# Patient Record
Sex: FEMALE | Race: WHITE
[De-identification: names, ages, dates, MRNs, and addresses within clinical notes are randomized per-mention and may not be internally consistent; named-entity substitution may affect disease eponyms.]

---

## 2018-02-06 NOTE — EKG
[place] [address]
Phone:  [phone]                     ELECTROCARDIOGRAM REPORT      
_______________________________________________________________________________
 
Name:       NAYLAEMILEE HALL OSORIO              Room:                      [facility]#:  [id number]      Account #:      [id number]  
Admission:  18     Attend Phys:                         
Discharge:  18     Date of Birth:  [birth year]  
         Report #: [id number]
    [id number]
_______________________________________________________________________________
THIS REPORT FOR:  //name//                      
 
                         [facility] ED
                                       
Test Date:    2018               Test Time:    11:14:16
Pat Name:     EMILEE MURILLO          Department:   
Patient ID:   [id number]            Room:          
Gender:       F                        Technician:   LUPE SHELLEY
:          1945               Requested By: Josue Chappell
Order Number: [id number] MD:   Billy Lawson
                                 Measurements
Intervals                              Axis          
Rate:         97                       P:            38
OK:           150                      QRS:          -24
QRSD:         83                       T:            31
QT:           351                                    
QTc:          446                                    
                           Interpretive Statements
Sinus rhythm
consider Inferior infarct, old
Anterior infarct, old
Compared to ECG 2016 16:05:12
Sinus tachycardia no longer present
Myocardial infarct finding still present
 
Electronically Signed On 2018 11:41:09 CST by Billy Lawson
[email or web address]
 
 
 
 
 
 
 
 
 
 
 
 
 
 
 
 
  <ELECTRONICALLY SIGNED>
                                           By: Billy Lawson MD, [facility]      
  18     1141
D: 18 1114   _____________________________________
T: 18 1114   Billy Lawson MD, [facility]        /EPI

## 2019-07-05 NOTE — NUR
PT INCREASINGLY SOB. O2 INCREASED TO 3L/NC. REFUSING TO TAKE RESP TX STATING
IT MAKES HER BP GO UP. DR NOTIFIED AND TX CHANGED. ALSO INFORMED OF TEMP
102.2, PT STATES SHE DOES THIS AT NIGHT OCC.

## 2019-07-05 NOTE — NUR
RECEIVED REPORT AND ASSUMED CARE OF PT, ASSESSMENT COMPLETED. FAMILY AT
BEDSIDE. PT SITTING UP IN BED, SOB WITH ACTIVITY. O2 ON AT 2L/NC. TELEMETRY ON
SHOWING ST. WILL CONT TO MONITOR AND ASSIST AS NEEDED.

## 2019-07-05 NOTE — NUR
ASSESSMENT COMPLETE. PT ADMITTED WITH PNEUMONIA. PT GIVEN IV ROCEPHIN AND
ZITHRO IN ER. RT QID. PT IS ON 2L PER NC, PT DOES NOT WEAR O2 AT HOME. PT HAS
HX OF COPD, FORMER SMOKER. PT HAD LOW GRADE TEMP, TYLENOL GIVEN IN ER. PT
TOLERATED DINNER, DENIES N/V. PT DENIES PAIN. NSR ON TELE MONITOR. PT IS
RESTING IN BED WITH FAMILY AT BEDSIDE. SEE ASSESSMENT AND VITALS FOR OTHER
DETAILS. CALL LIGHT WITHIN REACH, WILL CONTINUE PLAN OF CARE

## 2019-07-06 NOTE — NUR
AFTER ATIVAN GIVEN PT SLEPT VERY WELL. RESP STATUS IMPROVED. TEMP IMPROVED AND
PT DIAPHORETIC. O2 REMAINS ON. ASSISTED TO BSC. TELEMETRY CONT TO SHOW ST WITH
OCC PVC. HS GOALS OF REST AND SAFETY ACHIEVED. HOURLY ROUNDING OBSERVED.

## 2019-07-07 NOTE — NUR
PT SLEPT ON AND OFF THIS SHIFT. ASSESSMENT AS DOCUMENTED. MEDS GIVEN PER
E-MAR. IV PATENT. PT ANXIOUS AT TIMES THIS SHIFT. TELE MONITOR READING SR. OR
WORN. WILL CONTINUE WITH PLAN OF CARE.

## 2019-07-07 NOTE — NUR
CHANGE OF SHIFT, BEDSIDE REPORT GIVEN
PATIENT SEEN IN BED WITH DAUGHTER AT BEDSIDE
ASSUMED PATIENT CARE

## 2019-07-08 NOTE — CON
[address]  [address]                    CONSULTATION                  
_______________________________________________________________________________
 
Name:       EMILEE MURILLO              Room:           [address] [place]  
M.R.#:  [id number]      Account #:      [id number]  
Admission:  07/05/19     Attend Phys:    Thomas Acevedo MD 
Discharge:               Date of Birth:  [birth year]  
         Report #: [id number]
                                                                     [id number]  
_______________________________________________________________________________
THIS REPORT FOR:  //name//                      
 
CC: Thomas GREGORY
 
REQUESTING PHYSICIAN:  Thomas Acevedo MD
 
INDICATION FOR CONSULTATION:  Shortness of breath.
 
HISTORY OF PRESENT ILLNESS:  This is a 73-year-old female.  She carries a
diagnosis of chronic obstructive pulmonary disease.  She does appear to have had
significant bronchospasm; however, it is not completely clear to me if the
patient in fact has chronic obstructive pulmonary disease or asthma.  As noted
below it appears at first glance more likely to me that the patient has asthma
instead.  She does have a remote history of smoking.
 
At this time, the patient was admitted 2 days ago, presentation was with
increasing shortness of breath.  The patient has also been coughing and had been
bringing up green and yellow sputum.  She had reported feeling febrile as well
and did have a low-grade fever of 37.8 degrees Celsius on presentation.  She has
had significant hoarseness.  She is not able to describe to me how long she has
had this hoarseness.  She, however, does not describe a sore throat or runny
nose, otherwise.  She has had some pain in lower extremities.  She has had
varicose veins.  She does not report any change in these.  She does not have
swelling of lower extremities.  The patient has only had rare episodes of
heartburn.  She does have a history of disturbed sleep at night as well as
sleepiness during the day.
 
She has had some joint pains, which remain at baseline.  She does not have
abdominal pain.  She does not have nausea, vomiting, diarrhea or constipation. 
She does not have any urinary complaints.
 
REVIEW OF SYSTEMS:  I asked her 14 questions for review of systems.  The patient
answered to the negative except as mentioned above.
 
PAST MEDICAL HISTORY:  History of obstructive lung disease.  Chronic obstructive
pulmonary disease versus asthma, see further discussion as below.  Uncontrolled
hypertension, blood pressure on admission was 201/80.  She has had influenza in
the past, appendectomy, and hysterectomy.  There is an echo done 2 years ago,
which does show changes consistent with hypertension with left ventricular
hypertrophy, but normal left ventricular ejection fraction and no elevation in
the right heart pressures.  Also, history of right wrist contusion and
palpitations.
 
SOCIAL HISTORY:  She says that she was a smoker when she was young, but she
discontinued when she was in her mid 30s and has not smoked since then.  No
 
 
 
[place] [address]                    CONSULTATION                  
_______________________________________________________________________________
 
Name:       EMILEE MURILLO              Room:           [address] [place]  
..#:  [id number]      Account #:      [id number]  
Admission:  07/05/19     Attend Phys:    Thomas Acevedo MD 
Discharge:               Date of Birth:  [birth year]  
         Report #: [id number]
                                                                     [id number]  
_______________________________________________________________________________
known history of heavy alcohol use or illegal drug use.
 
CURRENT MEDICATIONS:  List in [place] reviewed.
 
HOME MEDICATIONS:  She is noted to be on Advair at home.
 
FAMILY HISTORY:  There is no family history pertinent to the presenting
complaint.
 
ALLERGIES:  SHE IS REPORTED TO HAVE AN ALLERGY TO PENICILLIN.  I DO NOT HAVE
DETAILS AVAILABLE.  SHE IS TOLERATING CEPHALOSPORINS WITHOUT ANY PROBLEMS.  SHE
IS REPORTED TO HAVE HAD ADVERSE REACTIONS OR ALLERGIES TO CODEINE AND BACTRIM AS
WELL.  AGAIN, DETAILS ARE NOT AVAILABLE.  SHE HAS HAD SOME ITCHING WITH
PREDNISONE; HOWEVER, I AM DOUBTFUL THAT SHE ACTUALLY IS ALLERGIC TO PREDNISONE.
 
PHYSICAL EXAMINATION:
GENERAL:  She is alert, awake and oriented.  Her responses to questions
frequently are, however, not related to the questions asked.  For example, when
I asked her when she came to this hospital, she responded by telling me when she
had pneumonia vaccines.  She does answer orientation questions correctly.  She
does appear to be hoarse.
VITAL SIGNS:  Has a pulse of 90 and a blood pressure 144/58.  She is saturating
93%.  She is not on supplemental oxygen.  Respiratory rate is mildly elevated to
20.  She is afebrile now with a temperature of 36.7.  Two days ago, she had a
fever of 37.8.
HEENT:  Head is normocephalic and atraumatic.  Pupils are equal and reactive. 
There is no throat erythema.  There is no thrush in her throat.  Airway is
Mallampati 3.
NECK:  Does not show raised JVP, asymmetry, mass or lymph nodes.
CHEST:  Symmetrical expansion on inspection and palpation.  On auscultation,
breath sounds are bilaterally equal, decreased, expirations are prolonged.  I do
not hear any added sounds.
HEART:  Regular.  There is no murmur.
ABDOMEN:  Soft and nontender.
EXTREMITIES:  Lower extremities show no edema.  There are significant varicose
veins noted bilaterally.  There is some calf discomfort.
SKIN:  However, is dry and intact.
NEUROLOGICAL:  Moves all extremities bilaterally equally and spontaneously with
no focal deficit identified.
 
LABORATORY DATA:  The patient's chest x-ray is reviewed and compared with the
patient's previous chest x-rays.  There are chronic changes suspicious of an
interstitial lung disease noted.  I do not see any definite evidence of new
infiltrate; however, I cannot rule out due to the presence of these chronic
changes.  The patient's CBC as well as chemistries from this morning are noted. 
Potassium of 3.1 this morning noted.  I requested magnesium to be added to this
 
 
 
[address] [place] [address]                    CONSULTATION                  
_______________________________________________________________________________
 
Name:       EMILEE MURILLO              Room:           [address] [place]  
.R.#:  [id number]      Account #:      [id number]  
Admission:  07/05/19     Attend Phys:    Thomas Acevedo MD 
Discharge:               Date of Birth:  [birth year]  
         Report #: [id number]
                                                                     [id number]  
_______________________________________________________________________________
morning's labs, which  came back normal.  Coagulation studies in [facility]
reviewed.  There are no positive cultures so far.
 
ASSESSMENT AND PLAN:
1.  Acute respiratory insufficiency.  The patient does appear to be
bronchospastic.  In addition, she does appear to have acute bacterial
bronchitis.  This is the likely etiology of her acute respiratory insufficiency.
2.  Bronchial asthma exacerbation.  The patient does have an obstructive lung
disease.  Certainly, it will be possible that she has chronic obstructive
pulmonary disease instead.  I do not have full details available.  At this time,
it appears more likely to me though that she has asthma.  We will continue with
Solu-Medrol as currently described.  She is on low dose of Xopenex.  We
potentially could increase the dose or switch her over to albuterol.  I went
ahead in fact and switched her over to DuoNeb in addition to adding Singulair. 
The patient has significant hoarseness.  Therefore, for now, I discontinued her
budesonide nebulizer as this will worsen hoarseness.  In the long run, however,
I do feel that she will benefit from an inhaled corticosteroid and I suggest
restarting an inhaled corticosteroid once her hoarseness improves.  Note that
she has been taking Advair at home.
3.  Lung infiltrates/lung scarring.  Most of the infiltrates noted on the
patient's chest x-ray appeared to be due to old scarring, but she has had
significant cough with sputum production.  Therefore, I feel that it is
reasonable to treat with Zithromax as well as ceftriaxone, as is currently
prescribed.  Since most of the changes are longstanding, I would go ahead and
obtain a CT chest without contrast, but with high resolution as this is
suspicious of an underlying interstitial lung disease.
4.  Hoarseness.  See discussion as above.  There may be a component of
gastroesophageal reflux as well.  The patient already is on Protonix.  The
patient may benefit from ENT evaluation if hoarseness persists.
5.  Hypokalemia.  Potassium was 3.1 this morning.  The patient has since then
received Lasix.  I therefore went ahead and placed her on the replacement
protocol.
6.  Hyperglycemia.  She does not have a history of diabetes.  Recommend
followup.  This may be related to Solu-Medrol.  As I would like to replace the
potassium first, I did not address it at this time, I would defer followup to
the hospitalist service  Hyperglycemia may in fact improve when the Solu-Medrol
dose is decreased later.
7.  Deep venous thrombosis prophylaxis.  The patient is on Lovenox.
 
Thanks for this consultation.
 
 
 
 
<ELECTRONICALLY SIGNED>
                                        By:  David Johnson MD         
07/08/19 0902
D: 07/07/19 1931_______________________________________
T: 07/08/19 0040Darryl Aparicio MD                 /nt

## 2019-07-08 NOTE — NUR
PATIENT RESTING IN CHAIR. UP TO CHAIR FOR MEALS TODAY. AMBULATING WITH WALKER
ACROSS ROOM WITH STAFF PRESENT. JAI DELGADILLO APPLIED BILAT LE. PATIENT PATRICIO WELL.
ABX INFUSED AS ORDERED. PATIENT STATES "THE DOCTOR SAYS I NEED TO TAKE IT ONE
DAY AT A TIME." APPROPRIATE USE OF CALL LIGHT. PROGRESSING TOWARD GOALS.

## 2019-07-08 NOTE — NUR
cm completed initial assessment to complete d/c planning. pt a&Ox4. pt states
she lives alone. dtrs are her support system. pt is independent w/cares and
she drives and works in her garden. pt states she uses cane, and walker for
support when using the commode at this if she cannot make it to the restroom.
pt has used HH in the distance past; however, she doesnt recall which company
she used. no hx w/snf. pt doesnt have any anticipated needs. cm will remain
available to assist if needed.

## 2019-07-08 NOTE — EKG
[place] [address]
Phone:  [phone]                     ELECTROCARDIOGRAM REPORT      
_______________________________________________________________________________
 
Name:       EMILEE MURILLO              Room:           [address]    [place]  
.R.#:  [id number]      Account #:      [id number]  
Admission:  19     Attend Phys:    Thomas Acevedo MD 
Discharge:               Date of Birth:  [birth year]  
         Report #: [id number]
    [id number]
_______________________________________________________________________________
THIS REPORT FOR:  //name//                      
 
                         [facility] ED
                                       
Test Date:    2019               Test Time:    13:08:45
Pat Name:     EMILEE MURILLO          Department:   
Patient ID:   [id number]            Room:         [facility]
Gender:       F                        Technician:   PITO
:          1945               Requested By: Ed Chang
Order Number: [id number] MD:   Billy Lawson
                                 Measurements
Intervals                              Axis          
Rate:         114                      P:            57
CO:           164                      QRS:          -14
QRSD:         90                       T:            30
QT:           319                                    
QTc:          440                                    
                           Interpretive Statements
Sinus tachycardia
Anterior infarct, old
Compared to ECG 2018 11:14:16
Sinus rhythm no longer present
Myocardial infarct finding still present
 
Electronically Signed On 2019 10:38:08 CDT by Billy Lawson
[email or web address]
 
 
 
 
 
 
 
 
 
 
 
 
 
 
 
 
 
  <ELECTRONICALLY SIGNED>
                                           By: Billy Lawson MD, FACC      
  19     1038
D: 19 1308   _____________________________________
T: 19 1308   Billy Lawson MD, [facility]        /EPI

## 2019-07-08 NOTE — NUR
Nutrition: Pt admitted with PNA. COPD, kyphosis, pulm fibrosis. , alb
2.2, prealb 11.7. 2gm Na diet, poor po intake. Wt is near usual, 166#. RX:
solumedrol. Severely sepleted protein stores and nsg risk 2 points. RD will
order Glucerna for added nutrition. Consider Mild risk at this time.

## 2019-07-08 NOTE — NUR
ASSUMED CARE AFTER REPORT. A&OX4, ABLE TO COMMUNICATE NEEDS TO STAFF. CARDIAC
MONITOR IN PLACE, SR, O2 SATS 94% RA. UP WITH WALKER TO BSC WITH SBA. NO C/O
PAIN, NAUSEA, SOA OR OTHER DISTRESS. TEARFUL WHEN SPEAKING ABOUT FAMILY
STRESSORS. THERAPEUTIC COMMUNICATION TECHNIQUE: LISTENING.  CALL LIGHT IN
REACH.

## 2019-07-08 NOTE — NUR
PATIENT SLEPT OFF AND ON THIS SHIFT. ASSESSMENT AS CHARTED. MEDS ADMINISTERED
PER EMAR. NO ACUTE CHANGES OVERNIGHT. WILL CONTINUE TO MONITOR PLAN OF CARE.

## 2019-07-09 NOTE — NUR
ASSUMED CARE AFTER REPORT. A&OX4. ABLE TO COMMUNICATE NEEDS TO STAFF. CARDIAC
MONITOR IN PLACE, SR. O2 SATS 94%. BP ELEVATED, PRN HYDRALAZINE GIVEN AS PER
MAR. RECHECK REVEALS BP  APPROPRIATE USE OF CALL LIGHT WHEN IN NEED OF BSC.
HOURLY ROUNDING FOR SAFETY AND PATIENT NEEDS.

## 2019-07-10 NOTE — NUR
CONTINUE TO FOLLOW, MET WITH PT TO DISCUSS DC, POSSIBLY TOMORROW. PT STATES
SHE HAS GOOD SUPPORT FROM HER DTRS THAT LIVE CLOSEBY.  PT IS NORMALLY VERY
INDEPENDENT; DRIVES, MOWS AND JUST [place].  SHE PLANS
TO RETURN HOME.  ENCOURAGED ACTIVITY TODAY

## 2019-07-10 NOTE — NUR
VSS, ASSUMED CARE IN THE AM, ASSESSMENT PERFORMED AND CHARTED, FALL
PRECAUTIONS IN PLACE AND CALL LIGHT IN REACH, PT IS A&O4 AND UP AD ARTI, PT
DENIES ANY PAIN, SHE IS TRAING SR ON THE MONITOR, ON RA, AND HER GOAL IS TO
WALK TO BATH ROOM AND SIT UP IN CHAIR, AT THIS TIME GAOLS HAVE BEEN MET, PT
SHOULD D/C LUCIE, HOURLY ROUNDS COMPLETED AND CHARTED, CHECKED,

## 2019-07-10 NOTE — NUR
PT VERY ANXIOUS THROUGHOUT THE SHIFT STATING "I DONT KNOW WHY MY BLOOD
PRESSURE IS SO HIGH. ITS NEVER THIS HIGH AT HOME. I KNOW IT'S THE BREATHING
TREATMENTS." PT'S BP /100 IMMEDIATELY PRIOR TO HS BREATHING TX. PRN
HYDRALAZINE GIVEN X 2 THIS SHIFT AS WELL AS PRN XANAX. MINIMAL TO MODERATE
EFFECTIVENESS. PT REFUSED AM RT TX. CALL LIGHT IN REACH. HOURLY ROUNDING FOR
SAFETY.

## 2019-07-11 NOTE — NUR
DISCHARGE PLANNER INFORMED THAT THE PATIENT IS READY TO D/C TODAY AND WILL
NEED HH. D/C PLANNER SPOKE TO THE PATIENT TO DISCUSS DISCHARGE PLANING AND
CHOICE OF HH. PATIENT INFORMS THAT SHE WOULD LIKE HH WITH [facility]. CHOICE
OF VENDOR FORM COMPLETED AND COPY PLACED ON CHART. D/C PLANNER INFORMED
[facility] OF THE HH REFERRAL AND FAXED THE PATIENT'S FACESHEET, H&P, AND D/C
ORDERS. CM WILL REMAIN AVAILABLE TO ASSIST AND FOLLOW AS NEEDED.
 
[facility] PHONE: [phone]  FAX: [phone]

## 2019-07-11 NOTE — NUR
DISCHARGE NOTE - REVIEWED INSTRUCTIONS WITH PT AND DTR.  NO QUESTIONS.  IV
REMOVED.  ALL BELONGINGS SENT WITH PT.

## 2019-09-27 ENCOUNTER — HOSPITAL ENCOUNTER (OUTPATIENT)
Dept: HOSPITAL 96 - M.MRI | Age: 74
End: 2019-09-27
Attending: ORTHOPAEDIC SURGERY
Payer: MEDICARE
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
## 2019-09-27 DIAGNOSIS — M17.11: ICD-10-CM

## 2019-09-27 DIAGNOSIS — S83.231A: Primary | ICD-10-CM
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
## 2019-10-24 LAB
ALBUMIN SERPL-MCNC: 3.8 G/DL (ref 3.4–5)
ALP SERPL-CCNC: 82 U/L (ref 46–116)
ALT SERPL-CCNC: 32 U/L (ref 30–65)
ANION GAP SERPL CALC-SCNC: 8 MMOL/L (ref 7–16)
AST SERPL-CCNC: 21 U/L (ref 15–37)
BACTERIA-REFLEX: (no result) /HPF
BILIRUB SERPL-MCNC: 0.3 MG/DL
BILIRUB UR-MCNC: NEGATIVE MG/DL
BUN SERPL-MCNC: 21 MG/DL (ref 7–18)
CALCIUM SERPL-MCNC: 9.6 MG/DL (ref 8.5–10.1)
CHLORIDE SERPL-SCNC: 104 MMOL/L (ref 98–107)
CO2 SERPL-SCNC: 30 MMOL/L (ref 21–32)
COLOR UR: YELLOW
CREAT SERPL-MCNC: 0.6 MG/DL (ref 0.6–1.3)
GLUCOSE SERPL-MCNC: 97 MG/DL (ref 70–99)
HCT VFR BLD CALC: 45.6 % (ref 37–47)
HGB BLD-MCNC: 15.2 GM/DL (ref 12–15)
INR PPP: 1
KETONES UR STRIP-MCNC: NEGATIVE MG/DL
MCH RBC QN AUTO: 29.6 PG (ref 26–34)
MCHC RBC AUTO-ENTMCNC: 33.5 G/DL (ref 28–37)
MCV RBC: 88.5 FL (ref 80–100)
MPV: 7.8 FL. (ref 7.2–11.1)
PLATELET COUNT*: 248 THOU/UL (ref 150–400)
POTASSIUM SERPL-SCNC: 3.6 MMOL/L (ref 3.5–5.1)
PROT SERPL-MCNC: 7.2 G/DL (ref 6.4–8.2)
PROT UR QL STRIP: NEGATIVE
PROTHROMBIN TIME: 10.3 SECONDS (ref 9.2–11.5)
RBC # BLD AUTO: 5.15 MIL/UL (ref 4.2–5)
RBC # UR STRIP: NEGATIVE /UL
RBC #/AREA URNS HPF: (no result) /HPF (ref 0–2)
RDW-CV: 14.5 % (ref 10.5–14.5)
SODIUM SERPL-SCNC: 142 MMOL/L (ref 136–145)
SP GR UR STRIP: <= 1.005 (ref 1–1.03)
SQUAMOUS: (no result) /LPF (ref 0–3)
URINE CLARITY: CLEAR
URINE GLUCOSE-RANDOM: NEGATIVE
URINE LEUKOCYTES-REFLEX: (no result)
URINE NITRITE-REFLEX: NEGATIVE
URINE WBC-REFLEX: (no result) /HPF (ref 0–5)
UROBILINOGEN UR STRIP-ACNC: 0.2 E.U./DL (ref 0.2–1)
WBC # BLD AUTO: 8.7 THOU/UL (ref 4–11)

## 2019-11-05 ENCOUNTER — HOSPITAL ENCOUNTER (INPATIENT)
Dept: HOSPITAL 96 - M.PRE | Age: 74
LOS: 3 days | Discharge: NURSING, REHAB OR LONG-TERM CARE | DRG: 469 | End: 2019-11-08
Attending: INTERNAL MEDICINE | Admitting: INTERNAL MEDICINE
Payer: MEDICARE

## 2019-11-05 VITALS — BODY MASS INDEX: 31.58 KG/M2 | WEIGHT: 185 LBS | HEIGHT: 64 IN

## 2019-11-05 VITALS — DIASTOLIC BLOOD PRESSURE: 93 MMHG | SYSTOLIC BLOOD PRESSURE: 198 MMHG

## 2019-11-05 DIAGNOSIS — E43: ICD-10-CM

## 2019-11-05 DIAGNOSIS — Z79.82: ICD-10-CM

## 2019-11-05 DIAGNOSIS — Z87.01: ICD-10-CM

## 2019-11-05 DIAGNOSIS — Z88.2: ICD-10-CM

## 2019-11-05 DIAGNOSIS — M17.11: Primary | ICD-10-CM

## 2019-11-05 DIAGNOSIS — Z88.6: ICD-10-CM

## 2019-11-05 DIAGNOSIS — Z79.899: ICD-10-CM

## 2019-11-05 DIAGNOSIS — Z90.710: ICD-10-CM

## 2019-11-05 DIAGNOSIS — J44.9: ICD-10-CM

## 2019-11-05 DIAGNOSIS — Z90.49: ICD-10-CM

## 2019-11-05 DIAGNOSIS — Z88.0: ICD-10-CM

## 2019-11-05 DIAGNOSIS — Z88.8: ICD-10-CM

## 2019-11-05 DIAGNOSIS — G89.18: ICD-10-CM

## 2019-11-05 DIAGNOSIS — Z87.891: ICD-10-CM

## 2019-11-05 DIAGNOSIS — E66.01: ICD-10-CM

## 2019-11-05 PROCEDURE — 0SRC0J9 REPLACEMENT OF RIGHT KNEE JOINT WITH SYNTHETIC SUBSTITUTE, CEMENTED, OPEN APPROACH: ICD-10-PCS | Performed by: ORTHOPAEDIC SURGERY

## 2019-11-06 VITALS — SYSTOLIC BLOOD PRESSURE: 101 MMHG | DIASTOLIC BLOOD PRESSURE: 54 MMHG

## 2019-11-06 VITALS — DIASTOLIC BLOOD PRESSURE: 48 MMHG | SYSTOLIC BLOOD PRESSURE: 124 MMHG

## 2019-11-06 VITALS — SYSTOLIC BLOOD PRESSURE: 142 MMHG | DIASTOLIC BLOOD PRESSURE: 54 MMHG

## 2019-11-06 VITALS — DIASTOLIC BLOOD PRESSURE: 59 MMHG | SYSTOLIC BLOOD PRESSURE: 151 MMHG

## 2019-11-06 VITALS — DIASTOLIC BLOOD PRESSURE: 53 MMHG | SYSTOLIC BLOOD PRESSURE: 129 MMHG

## 2019-11-06 LAB
HCT VFR BLD CALC: 40.8 % (ref 37–47)
HGB BLD-MCNC: 13.1 GM/DL (ref 12–15)

## 2019-11-06 NOTE — OP
[facility] 
[address] [place]  [address]                    OPERATIVE REPORT              
_______________________________________________________________________________
 
Name:       EMILEE MURILLO            Room:           [address] [place]  
M.R.#:  [id number]      Account #:      [id number]  
Admission:  11/05/19     Attend Phys:    CINDA Reyes
Discharge:               Date of Birth:  [birth year]  
         Report #: [id number]
                                                                     [id number]  
_______________________________________________________________________________
THIS REPORT FOR:  //name//                      
 
CC: ORIANA Rocha
 
DATE OF SERVICE:  11/05/2019
 
 
PREOPERATIVE DIAGNOSIS:  Right knee osteoarthritis.
 
POSTOPERATIVE DIAGNOSIS:  Right knee osteoarthritis.
 
PROCEDURE:  Right total knee arthroplasty.
 
SURGEON:  Josue Coppola II, DO.
 
ASSISTANT:  UBALDO Levy.
 
ANESTHESIA:  General endotracheal.
 
ESTIMATED BLOOD LOSS:  50 mL.
 
ANTIBIOTICS:  Ancef preoperatively.
 
DRAINS:  Medium Hemovac.
 
COMPLICATIONS:  None.
 
CONDITION OF THE PATIENT:  Stable to recovery room.
 
IMPLANTS:  Listed in operative record and progress note.
 
BRIEF HISTORY:  The patient was seen in the preoperative area.  Preoperative H
and P was performed.  Site was marked, questions were answered.  Risks and
benefits were discussed with the patient in detail about surgery.  The patient
wished to proceed, assuming all risks.
 
DESCRIPTION OF PROCEDURE:  The patient was taken to the operative suite and
placed supine on the OR table in appropriate anesthesia.  A well-padded
tourniquet was applied to the upper thigh, which was inflated to 300 mmHg after
gravity exsanguination.  The operative knee was sterilely prepped and draped. 
Surgery began by midline incision.  This was carried down to the subcutaneous
tissues.  A medial parapatellar arthrotomy was performed and carried down to
bone.  The patella was then everted and excess soft tissues removed from around
the femur.  Femoral cutting block was then applied, checked with a drop deniz for
 
 
 
[address]  [address]                    OPERATIVE REPORT              
_______________________________________________________________________________
 
Name:       NAYLARUTHIECONCETTAEMILEE KAY            Room:           [address] [place]  
[facility].#:  [id number]      Account #:      [id number]  
Admission:  11/05/19     Attend Phys:    CINDA Reyes
Discharge:               Date of Birth:  [birth year]  
         Report #: [id number]
                                                                     [id number]  
_______________________________________________________________________________
rotational alignment, pinned in appropriate position and appropriate cuts were
made.  A 4-in-1 cutting block was then applied, checked for rotational
alignment, pinned in appropriate position and appropriate cuts were made.  The
tibia was then exposed.  Excess meniscus was removed.  Retractor was placed on
collateral ligaments.  The tibial cutting block was then applied, pinned in
appropriate position, checked with drop deniz for rotational alignment and slope
and appropriate cut was made.  The tibial bone was removed.  The tibial base
plate was then applied, checked for rotational alignment with the drop deniz and
pinned in appropriate position.  The femur was then applied and box cut was
reamed.  This was then trialed with appropriate spacer, which showed excellent
fit and fill and excellent stability of the knee through all range of motion. 
Patella was then reamed in appropriate fashion and sized to appropriate size. 
Three peg holes were drilled and it was then trialed and showed excellent
flexion, extension, excellent tracking of the patella within the groove.  These
trials were removed.  The tibia was then punched in appropriate fashion.  Bone
ends were cleansed with Pulsavac irrigation and cement was mixed and applied to
final implants.  These were then malleted in position, held the knee in
extension and compressed to allow cement to cure.  After it cured, excess was
removed using Garibaldi and osteotome.  Wound was then copiously irrigated and the
final spacer was then malleted into position.  The tourniquet was deflated. 
Hemostasis was obtained with electrocautery.  Pain cocktail was injected.  PRP
gel was sprayed throughout the internal aspects of the knee.  Medium Hemovac
drain was applied.  Capsule was closed with #2 FiberWire and #1 Vicryl in
figure-of-eight fashion.  Skin was closed with 2-0 Vicryl and running 3-0
Monocryl.  Dermabond and sterile dressing was then applied.  Ace wrap and
PolarCare applied.  The patient transported to recovery room in stable
condition.  Counts were correct throughout the procedure.
 
 
 
 
 
 
 
 
 
 
 
 
 
 
 
 
 
<ELECTRONICALLY SIGNED>
                                        By:  Josue Coppola II, DO     
11/06/19     2259
D: 11/06/19 0730_______________________________________
T: 11/06/19 0755Josue Coppola II, DO        /nt

## 2019-11-07 VITALS — SYSTOLIC BLOOD PRESSURE: 138 MMHG | DIASTOLIC BLOOD PRESSURE: 57 MMHG

## 2019-11-07 VITALS — DIASTOLIC BLOOD PRESSURE: 54 MMHG | SYSTOLIC BLOOD PRESSURE: 135 MMHG

## 2019-11-07 LAB
ALBUMIN SERPL-MCNC: 2.5 G/DL (ref 3.4–5)
ALP SERPL-CCNC: 68 U/L (ref 46–116)
ALT SERPL-CCNC: 19 U/L (ref 30–65)
ANION GAP SERPL CALC-SCNC: 7 MMOL/L (ref 7–16)
AST SERPL-CCNC: 15 U/L (ref 15–37)
BILIRUB SERPL-MCNC: 0.5 MG/DL
BUN SERPL-MCNC: 15 MG/DL (ref 7–18)
CALCIUM SERPL-MCNC: 8.2 MG/DL (ref 8.5–10.1)
CHLORIDE SERPL-SCNC: 104 MMOL/L (ref 98–107)
CO2 SERPL-SCNC: 26 MMOL/L (ref 21–32)
CREAT SERPL-MCNC: 0.6 MG/DL (ref 0.6–1.3)
GLUCOSE SERPL-MCNC: 147 MG/DL (ref 70–99)
HCT VFR BLD CALC: 39.6 % (ref 37–47)
HGB BLD-MCNC: 12.8 GM/DL (ref 12–15)
MAGNESIUM SERPL-MCNC: 1.9 MG/DL (ref 1.8–2.4)
MCH RBC QN AUTO: 28.7 PG (ref 26–34)
MCHC RBC AUTO-ENTMCNC: 32.3 G/DL (ref 28–37)
MCV RBC: 88.7 FL (ref 80–100)
MPV: 8 FL. (ref 7.2–11.1)
PLATELET COUNT*: 189 THOU/UL (ref 150–400)
POTASSIUM SERPL-SCNC: 4 MMOL/L (ref 3.5–5.1)
PROT SERPL-MCNC: 5.8 G/DL (ref 6.4–8.2)
RBC # BLD AUTO: 4.46 MIL/UL (ref 4.2–5)
RDW-CV: 14.4 % (ref 10.5–14.5)
SODIUM SERPL-SCNC: 137 MMOL/L (ref 136–145)
WBC # BLD AUTO: 11.5 THOU/UL (ref 4–11)

## 2019-11-08 VITALS — DIASTOLIC BLOOD PRESSURE: 63 MMHG | SYSTOLIC BLOOD PRESSURE: 156 MMHG

## 2019-11-08 VITALS — DIASTOLIC BLOOD PRESSURE: 56 MMHG | SYSTOLIC BLOOD PRESSURE: 130 MMHG

## 2019-11-08 VITALS — DIASTOLIC BLOOD PRESSURE: 73 MMHG | SYSTOLIC BLOOD PRESSURE: 124 MMHG

## 2020-06-06 ENCOUNTER — HOSPITAL ENCOUNTER (INPATIENT)
Dept: HOSPITAL 96 - M.ERS | Age: 75
LOS: 8 days | Discharge: NURSING, REHAB OR LONG-TERM CARE | DRG: 516 | End: 2020-06-14
Attending: INTERNAL MEDICINE | Admitting: INTERNAL MEDICINE
Payer: MEDICARE

## 2020-06-06 VITALS — SYSTOLIC BLOOD PRESSURE: 204 MMHG | DIASTOLIC BLOOD PRESSURE: 91 MMHG

## 2020-06-06 VITALS — DIASTOLIC BLOOD PRESSURE: 92 MMHG | SYSTOLIC BLOOD PRESSURE: 202 MMHG

## 2020-06-06 VITALS — HEIGHT: 64 IN | BODY MASS INDEX: 33.63 KG/M2 | WEIGHT: 197 LBS

## 2020-06-06 VITALS — DIASTOLIC BLOOD PRESSURE: 96 MMHG | SYSTOLIC BLOOD PRESSURE: 193 MMHG

## 2020-06-06 VITALS — SYSTOLIC BLOOD PRESSURE: 194 MMHG | DIASTOLIC BLOOD PRESSURE: 89 MMHG
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
## 2020-06-06 DIAGNOSIS — Z79.82: ICD-10-CM

## 2020-06-06 DIAGNOSIS — S33.5XXA: ICD-10-CM

## 2020-06-06 DIAGNOSIS — Z87.891: ICD-10-CM

## 2020-06-06 DIAGNOSIS — M19.90: ICD-10-CM
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
## 2020-06-06 DIAGNOSIS — R65.10: ICD-10-CM

## 2020-06-06 DIAGNOSIS — Z03.818: ICD-10-CM

## 2020-06-06 DIAGNOSIS — Z90.49: ICD-10-CM

## 2020-06-06 DIAGNOSIS — D72.0: ICD-10-CM

## 2020-06-06 DIAGNOSIS — Z79.899: ICD-10-CM

## 2020-06-06 DIAGNOSIS — Z90.710: ICD-10-CM

## 2020-06-06 DIAGNOSIS — Z88.5: ICD-10-CM

## 2020-06-06 DIAGNOSIS — Z88.2: ICD-10-CM

## 2020-06-06 DIAGNOSIS — Z88.1: ICD-10-CM

## 2020-06-06 DIAGNOSIS — Z88.0: ICD-10-CM

## 2020-06-06 DIAGNOSIS — J44.9: ICD-10-CM

## 2020-06-06 DIAGNOSIS — Z91.018: ICD-10-CM

## 2020-06-06 DIAGNOSIS — M80.08XA: Primary | ICD-10-CM
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
## 2020-06-06 DIAGNOSIS — Z87.01: ICD-10-CM

## 2020-06-06 LAB
ABSOLUTE EOSINOPHILS: 0.3 THOU/UL (ref 0–0.7)
ABSOLUTE MONOCYTES: 0.7 THOU/UL (ref 0–1.2)
ALBUMIN SERPL-MCNC: 3.7 G/DL (ref 3.4–5)
ALP SERPL-CCNC: 112 U/L (ref 46–116)
ALT SERPL-CCNC: 31 U/L (ref 30–65)
ANION GAP SERPL CALC-SCNC: 8 MMOL/L (ref 7–16)
AST SERPL-CCNC: 22 U/L (ref 15–37)
BILIRUB SERPL-MCNC: 0.7 MG/DL
BUN SERPL-MCNC: 16 MG/DL (ref 7–18)
CALCIUM SERPL-MCNC: 8.8 MG/DL (ref 8.5–10.1)
CHLORIDE SERPL-SCNC: 105 MMOL/L (ref 98–107)
CO2 SERPL-SCNC: 27 MMOL/L (ref 21–32)
CREAT SERPL-MCNC: 0.5 MG/DL (ref 0.6–1.3)
EOSINOPHIL NFR BLD: 2 %
GLUCOSE SERPL-MCNC: 138 MG/DL (ref 70–99)
GRANULOCYTES NFR BLD MANUAL: 86 %
HCT VFR BLD CALC: 47.2 % (ref 37–47)
HGB BLD-MCNC: 16 GM/DL (ref 12–15)
LYMPHOCYTES # BLD: 0.8 THOU/UL (ref 0.8–5.3)
LYMPHOCYTES NFR BLD AUTO: 6 %
MCH RBC QN AUTO: 29.5 PG (ref 26–34)
MCHC RBC AUTO-ENTMCNC: 33.9 G/DL (ref 28–37)
MCV RBC: 87.1 FL (ref 80–100)
MONOCYTES NFR BLD: 5 %
MPV: 7.9 FL. (ref 7.2–11.1)
NEUTROPHILS # BLD: 11.4 THOU/UL (ref 1.6–8.1)
NEUTS BAND NFR BLD: 1 %
NUCLEATED RBCS: 0 /100WBC
PLATELET # BLD EST: ADEQUATE 10*3/UL
PLATELET COUNT*: 256 THOU/UL (ref 150–400)
POTASSIUM SERPL-SCNC: 3.8 MMOL/L (ref 3.5–5.1)
PROT SERPL-MCNC: 7.9 G/DL (ref 6.4–8.2)
RBC # BLD AUTO: 5.41 MIL/UL (ref 4.2–5)
RDW-CV: 15.1 % (ref 10.5–14.5)
SODIUM SERPL-SCNC: 140 MMOL/L (ref 136–145)
WBC # BLD AUTO: 13.1 THOU/UL (ref 4–11)

## 2020-06-07 VITALS — SYSTOLIC BLOOD PRESSURE: 166 MMHG | DIASTOLIC BLOOD PRESSURE: 72 MMHG

## 2020-06-07 VITALS — SYSTOLIC BLOOD PRESSURE: 149 MMHG | DIASTOLIC BLOOD PRESSURE: 63 MMHG

## 2020-06-07 VITALS — DIASTOLIC BLOOD PRESSURE: 81 MMHG | SYSTOLIC BLOOD PRESSURE: 181 MMHG

## 2020-06-07 VITALS — SYSTOLIC BLOOD PRESSURE: 155 MMHG | DIASTOLIC BLOOD PRESSURE: 75 MMHG

## 2020-06-07 NOTE — NUR
CARDIAC MONITOR DC'D EARLIER PER ORDERS.  PATIENT APPEARS TO MORE COMFORTABLE
DURING SHIFT - ICE PACK AND HEAT APPLIED TO ASSIST WITH COMFORT.  CONTINUES TO
REFUSING ANY FURTHER PAIN MEDICATION AT THIS TIME.  USING BEDPAN TO VOID.
CALL LIGHT WITHIN REACH.  WILL CONTINUE WITH PLAN OF CARE.

## 2020-06-07 NOTE — NUR
PT ADMITTED TO ROOM 203 DURING THIS SHIFT; VSS (EXCEPT FOR ELEVATED BP; MD WOLFE), A+OX4, SINUS TACHICARDIA, PT REPORTS PAIN ONLY DURING MOVEMENT, UNABLE
TO SIT OR GET OUT OF BED D/T PAIN IN BACK. SHE IS ABLE TO COMMUNICATE HER
NEEDS TO STAFF EFFECTIVELY. SHE HAS DENIED THE NEED FOR PAIN MEDICATION UP TO
THIS TIME. PT HAS BEEN CONSULTED FOR AN EVAL/TREATMENT AS APPROPRIATE.

## 2020-06-07 NOTE — NUR
AM ASSESSMENT COMPLETE, DEFER TO COMPUTER CHARTING.  CARDIAC MONITOR TRACKING
SR TO ST.  REPORTING HAVING BACK PAIN, REFUSING PAIN MEDICATION WHEN OFFERED.
ICE PACK AND LIDOCAINE PATCH PLACED ON BACK TO ASSIST WITH PAIN CONTROL.  HOB
ELEVATED, CALL LIGHT WITHIN REACH.  WILL MONITOR.

## 2020-06-08 VITALS — DIASTOLIC BLOOD PRESSURE: 90 MMHG | SYSTOLIC BLOOD PRESSURE: 179 MMHG

## 2020-06-08 VITALS — SYSTOLIC BLOOD PRESSURE: 189 MMHG | DIASTOLIC BLOOD PRESSURE: 74 MMHG

## 2020-06-08 VITALS — DIASTOLIC BLOOD PRESSURE: 74 MMHG | SYSTOLIC BLOOD PRESSURE: 184 MMHG

## 2020-06-08 VITALS — DIASTOLIC BLOOD PRESSURE: 83 MMHG | SYSTOLIC BLOOD PRESSURE: 178 MMHG

## 2020-06-08 NOTE — NUR
Pt is A&O. Resides at home alone. Active and independent. Pt has a walker and
cane that she can uses if needed. Hx of [facility]. Hx of
skilled at [facility]. Supportive kids. Pt may be having a kyphoplasty,
and will likely need skilled vs rehab at [facility]. Per Pt, she does not have anyone
that could stay with her 24hrs/day if needed post acute rehab, so would want
to dc to [facility]. Following.

## 2020-06-09 VITALS — DIASTOLIC BLOOD PRESSURE: 85 MMHG | SYSTOLIC BLOOD PRESSURE: 154 MMHG

## 2020-06-09 VITALS — SYSTOLIC BLOOD PRESSURE: 194 MMHG | DIASTOLIC BLOOD PRESSURE: 83 MMHG

## 2020-06-09 VITALS — SYSTOLIC BLOOD PRESSURE: 151 MMHG | DIASTOLIC BLOOD PRESSURE: 71 MMHG

## 2020-06-09 VITALS — DIASTOLIC BLOOD PRESSURE: 74 MMHG | SYSTOLIC BLOOD PRESSURE: 158 MMHG

## 2020-06-09 VITALS — DIASTOLIC BLOOD PRESSURE: 78 MMHG | SYSTOLIC BLOOD PRESSURE: 154 MMHG

## 2020-06-09 VITALS — SYSTOLIC BLOOD PRESSURE: 184 MMHG | DIASTOLIC BLOOD PRESSURE: 75 MMHG

## 2020-06-09 LAB
HCT VFR BLD CALC: 46.2 % (ref 37–47)
HGB BLD-MCNC: 15.7 GM/DL (ref 12–15)
MCH RBC QN AUTO: 29.6 PG (ref 26–34)
MCHC RBC AUTO-ENTMCNC: 34 G/DL (ref 28–37)
MCV RBC: 87.1 FL (ref 80–100)
MPV: 8.3 FL. (ref 7.2–11.1)
PLATELET COUNT*: 277 THOU/UL (ref 150–400)
RBC # BLD AUTO: 5.31 MIL/UL (ref 4.2–5)
RDW-CV: 15 % (ref 10.5–14.5)
WBC # BLD AUTO: 10.4 THOU/UL (ref 4–11)

## 2020-06-09 NOTE — NUR
Per , Pt to have kyphoplasty tomorrow. CM faxed initial referral to [facility] for skilled. Following.

## 2020-06-10 VITALS — DIASTOLIC BLOOD PRESSURE: 77 MMHG | SYSTOLIC BLOOD PRESSURE: 154 MMHG

## 2020-06-10 VITALS — SYSTOLIC BLOOD PRESSURE: 173 MMHG | DIASTOLIC BLOOD PRESSURE: 64 MMHG

## 2020-06-10 VITALS — SYSTOLIC BLOOD PRESSURE: 154 MMHG | DIASTOLIC BLOOD PRESSURE: 77 MMHG

## 2020-06-10 NOTE — NUR
ASSUMED CARE OF PATIENT THIS AM AT 0730.  PATIENT IS ALERT AND ORIENTED X 4.
SHE C/O CONTINUED BACK PAIN.  PATIENT ASSISTED UP TO BSC THIS AM.  PATIENT
KEPT NPO FOR SCHEDULED PROCEDURE.  PATIENT TO TRANSFER TO ORTHO SPINE.

## 2020-06-10 NOTE — NUR
PT A&Ox4. VITALS STABLE. IV PATENT. PAIN DECREASED SINCE PROCEDURE. REGULAR
DIET ORDERED. FALL PRECAUTIONS IN PLACE. CALL LIGHT WITHIN REACH. WILL
CONTINUE TO MONITOR.

## 2020-06-10 NOTE — NUR
ASSUMED PT CARE AT APPROX 1930. PT IS AWAKE AND ORIENTED X4. ASSESSMENT DONE
AND CHARTED. PT IS ADVISED TO HAVE NOTHING BY MOUTH AFTER MIDNIGHT FOR
POSSIBLE KYPHOPLASTY 06/10. PT C/O LOWER BACK PAIN PARTIALLY RELIEVED BY PAIN
MEDICINE GIVEN PER MAR. NO ACUTE CHANGES THROUGH THE NIGHT. PT IS ABLE TO
SLEEP MOST OF THE NIGHT. CALL LIGHT WITHIN REACH. HOURLY ROUNDING DONE FOR PT
SAFETY. HIGH FALL PRECAUTIONS IN PLACE

## 2020-06-11 VITALS — DIASTOLIC BLOOD PRESSURE: 73 MMHG | SYSTOLIC BLOOD PRESSURE: 162 MMHG

## 2020-06-11 VITALS — DIASTOLIC BLOOD PRESSURE: 63 MMHG | SYSTOLIC BLOOD PRESSURE: 156 MMHG

## 2020-06-11 VITALS — DIASTOLIC BLOOD PRESSURE: 66 MMHG | SYSTOLIC BLOOD PRESSURE: 152 MMHG

## 2020-06-11 VITALS — DIASTOLIC BLOOD PRESSURE: 66 MMHG | SYSTOLIC BLOOD PRESSURE: 148 MMHG

## 2020-06-11 VITALS — DIASTOLIC BLOOD PRESSURE: 80 MMHG | SYSTOLIC BLOOD PRESSURE: 174 MMHG

## 2020-06-11 NOTE — NUR
Alert and oriented x 4. She was extremely cold in her room. Maintenance said
they could not increase the temp in the room. We did move her to a different
room and it has been better for her. She is moving better according to her.
She has been up to the bedside commode with stand by assist. BP was elevated
at beginning of the shift and PRN IV hydralazine was given. She did also state
that she does not take celebrex or flomax nasal spray. New order recieved for
miralax. She had oral pain meds for her lumbar spine x 2. She did have
benadryl at midnight for itching.

## 2020-06-11 NOTE — NUR
ASSUMED CARE OF PATIENT AT APPROX 0730. ALERT AND ORIENTED X4. ASSESSMENT
COMPLETED AND AS CHARTED. VSS ON ROOM AIR. COMPLAINTS OF PAIN ADDRESSED WITH
TRAMADOL, TYLENOL, LIDOCAINE PATCH AND ICE PACKS. PATIENT IS UP WITH ASSIST TO
THE BATHROOM. NO OTHER COMPLAINTS THIS SHIFT. CALL LIGHT IN REACH. HOURLY
ROUNDS COMPLETED. WILL CONTINUE WITH PLAN OF CARE.

## 2020-06-12 VITALS — SYSTOLIC BLOOD PRESSURE: 151 MMHG | DIASTOLIC BLOOD PRESSURE: 75 MMHG

## 2020-06-12 VITALS — DIASTOLIC BLOOD PRESSURE: 71 MMHG | SYSTOLIC BLOOD PRESSURE: 172 MMHG

## 2020-06-12 VITALS — DIASTOLIC BLOOD PRESSURE: 64 MMHG | SYSTOLIC BLOOD PRESSURE: 121 MMHG

## 2020-06-12 NOTE — NUR
PT.REFUSING SNF. PUT IN REHAB EVAL. THERAPIES SAW HER TODAY. PER
TALHA/REHAB NURIA, PLAN FOR PT.IS TO BE REEVAL'D FOR INPT.REHAB TOMORROW
BY THERAPIES. DEPEMDING ON HOW SHE DOES,SHE MAY BE ABLE TO GO TO REHAB SUNDAY
OR MONDAY. [facility] COULD NOT HOLD BED AFTER TODAY, IF PT.NEEDS SNF.
DISCUSSED WITH PT.AND CM CALLED DAUGHTER,APRIL.

## 2020-06-12 NOTE — NUR
PT IS A/O X4,VSS,MED-SURG STATUS.PAIN MANAGED WELL WITH PO MEDICATIONS.PT
WORKED WITH PHYSICAL THERAPY AND OT.SAT UP IN CHAIR FOR SEVERAL HOURS.DRESSING
CLEAN, DRY, AND INTACT.PT INFORMED OF PLAN OF CARE AND COMMUNICATES
UNDERSTANDING.CALL LIGHT AND FALL PRECAUTIONS IN PLACE.WILL CONTINUE TO
MONITOR FOR DURATION OF SHIFT.

## 2020-06-12 NOTE — NUR
Alert and oriented x 4. She is up with stand by assist to bedside commode.
Dressing to midback is intact. She did have pain med at bedtime and has slept
well. She did discuss last evening how she doesn't want to go to skilled. Her
PCR for corona virus was not detected. Message sent to Hospitalist this am.
She has slept well.

## 2020-06-13 VITALS — DIASTOLIC BLOOD PRESSURE: 69 MMHG | SYSTOLIC BLOOD PRESSURE: 157 MMHG

## 2020-06-13 VITALS — SYSTOLIC BLOOD PRESSURE: 143 MMHG | DIASTOLIC BLOOD PRESSURE: 63 MMHG

## 2020-06-13 NOTE — NUR
PT A&O, VSS ON RA. MEDS GIVEN AS ORDERED. PAIN MANAGED WITH TRAMADOL. UP WITH
STANDBY ASSIST. DRESSING ON THE BACK C/D/I. NO OTHER CONCERNS AT THIS TIME.
WILL COTNINUE TO MONITOR.

## 2020-06-13 NOTE — NUR
PT A&Ox4. VITALS STABLE. IV PATENT, SL. PAIN CONTROLLED WITH NORCO. TOLERATING
DIET. DENIED N/V. POSSIBLE REHAB TRANSFER TOMORROW. UP WITH STAND BY ASSIST.
FALL PRECAUTIONS IN PLACE. CALL LIGHT WITHIN REACH. WILL CONTINUE TO MONITOR.

## 2020-06-14 ENCOUNTER — HOSPITAL ENCOUNTER (INPATIENT)
Dept: HOSPITAL 96 - M.REH | Age: 75
LOS: 7 days | Discharge: HOME HEALTH | DRG: 543 | End: 2020-06-21
Attending: PHYSICAL MEDICINE & REHABILITATION | Admitting: PHYSICAL MEDICINE & REHABILITATION
Payer: MEDICARE

## 2020-06-14 VITALS — WEIGHT: 199.3 LBS | BODY MASS INDEX: 34.02 KG/M2 | HEIGHT: 64.02 IN

## 2020-06-14 VITALS — DIASTOLIC BLOOD PRESSURE: 57 MMHG | SYSTOLIC BLOOD PRESSURE: 139 MMHG

## 2020-06-14 VITALS — SYSTOLIC BLOOD PRESSURE: 160 MMHG | DIASTOLIC BLOOD PRESSURE: 71 MMHG

## 2020-06-14 VITALS — DIASTOLIC BLOOD PRESSURE: 69 MMHG | SYSTOLIC BLOOD PRESSURE: 154 MMHG

## 2020-06-14 VITALS — SYSTOLIC BLOOD PRESSURE: 154 MMHG | DIASTOLIC BLOOD PRESSURE: 69 MMHG

## 2020-06-14 DIAGNOSIS — Z88.5: ICD-10-CM

## 2020-06-14 DIAGNOSIS — M80.88XA: Primary | ICD-10-CM

## 2020-06-14 DIAGNOSIS — J44.9: ICD-10-CM

## 2020-06-14 DIAGNOSIS — Z88.0: ICD-10-CM

## 2020-06-14 DIAGNOSIS — M19.90: ICD-10-CM

## 2020-06-14 DIAGNOSIS — D72.0: ICD-10-CM

## 2020-06-14 DIAGNOSIS — R65.10: ICD-10-CM

## 2020-06-14 DIAGNOSIS — Z90.710: ICD-10-CM

## 2020-06-14 DIAGNOSIS — Z88.8: ICD-10-CM

## 2020-06-14 DIAGNOSIS — Z90.49: ICD-10-CM

## 2020-06-14 NOTE — NUR
REPORT GIVEN TO REHAB NURSE. PT BELONGINGS GATHERED. IV REMOVED. PT LEFT VIA
WHEELCHAIR WITH NURSING STAFF TO REHAB. FALL RISK PRECAUTIONS IN PLACE.
HOURLY ROUNDING COMPLETED/

## 2020-06-14 NOTE — NUR
PATIENT A&OX4, ON ROOM AIR, VSS, PT UP WITH SBA. PAIN MED REQUESTED AND GIVEN
AS ORDERED. PT SLEEPING WELL. WILL CONTINUE WITH PLAN OF CARE.

## 2020-06-15 VITALS — SYSTOLIC BLOOD PRESSURE: 173 MMHG | DIASTOLIC BLOOD PRESSURE: 76 MMHG

## 2020-06-15 VITALS — SYSTOLIC BLOOD PRESSURE: 156 MMHG | DIASTOLIC BLOOD PRESSURE: 74 MMHG

## 2020-06-15 LAB
ALBUMIN SERPL-MCNC: 2.9 G/DL (ref 3.4–5)
ALP SERPL-CCNC: 111 U/L (ref 46–116)
ALT SERPL-CCNC: 24 U/L (ref 30–65)
ANION GAP SERPL CALC-SCNC: 5 MMOL/L (ref 7–16)
AST SERPL-CCNC: 20 U/L (ref 15–37)
BILIRUB SERPL-MCNC: 0.3 MG/DL
BUN SERPL-MCNC: 15 MG/DL (ref 7–18)
CALCIUM SERPL-MCNC: 9.7 MG/DL (ref 8.5–10.1)
CHLORIDE SERPL-SCNC: 99 MMOL/L (ref 98–107)
CO2 SERPL-SCNC: 32 MMOL/L (ref 21–32)
CREAT SERPL-MCNC: 0.6 MG/DL (ref 0.6–1.3)
GLUCOSE SERPL-MCNC: 121 MG/DL (ref 70–99)
HCT VFR BLD CALC: 42 % (ref 37–47)
HGB BLD-MCNC: 14.2 GM/DL (ref 12–15)
MCH RBC QN AUTO: 29.5 PG (ref 26–34)
MCHC RBC AUTO-ENTMCNC: 33.9 G/DL (ref 28–37)
MCV RBC: 86.9 FL (ref 80–100)
MPV: 8.1 FL. (ref 7.2–11.1)
PLATELET COUNT*: 275 THOU/UL (ref 150–400)
POTASSIUM SERPL-SCNC: 3.6 MMOL/L (ref 3.5–5.1)
PROT SERPL-MCNC: 6.6 G/DL (ref 6.4–8.2)
RBC # BLD AUTO: 4.83 MIL/UL (ref 4.2–5)
RDW-CV: 14.9 % (ref 10.5–14.5)
SODIUM SERPL-SCNC: 136 MMOL/L (ref 136–145)
WBC # BLD AUTO: 8.2 THOU/UL (ref 4–11)

## 2020-06-15 NOTE — NUR
PT ARRIVED TO FLOOR AT APPROX 1830.  PT SITTING IN CHAIR AT SHIFT CHANGE,
ADMISSION DATABASE COMPLETED AND REHAB ROUTINE EXPLAINED. PT ALERT AND
ORIENTED X4, POLITE AND COOPERATIVE WITH CARES.  PT C/O LOWER BACK
PAIN, TRAMADOL GIVEN PER ORDERS.  PT SKIN INTACT, SMALL DRESSING ON BACK FROM
KYPHOPLASTY.  PT UP WITH ASSIST OF ONE, GAIT BELT AND WALKER. SLEPT WELL
OVERNIGHT.  USES CALL LIGHT APPROPRIATELY.  CALL LIGHT IN REACH, BED ALARM ON
FOR SAFETY.  HOURLY ROUNDING IN PROGRESS, WILL CONTINUE TO MONITOR.

## 2020-06-15 NOTE — NUR
SW met with pt to complete initial assessment for inpt rehab.  Pt lives at
home alone. Pt has supportive dtr and dpoa, April.  Pt has other support as
well.  Pt has RW, hospital bed, borrowed commode over her toilet.  Pt would
like assist in trying to receive a new mattress for her hospital bed.  SW to
continue to follow to assist with safe dc planning. SW provided welcome folder
and irf mannie consent form; pt wanted to look over for a while and SW will
follow up with pt on Wednesday after team conference.

## 2020-06-15 NOTE — NUR
PATIENT HAS HAD A GOOD DAY. SHE PARTICIPATED IN PT AND WENT TO THE GYM THIS
MORNING. SHE AMBULATES WITH A WALKER TO THE BATHROOM WITH STAND BY ASSIST.
TAKES MEDS WHOLE AND FEEDS HERSELF. SHE NAPPED A FEW TIMES TODAY BUT HAS BEEN
WATCHING TV MOSTLY. SHE CAN MAKE HER NEEDS KNOWN AND USES HER CALL LIGHT WHEN
SHE NEEDS TO GET UP. FALL PRECAUTIONS HAVE BEEN MAINTAINED TODAY.

## 2020-06-16 VITALS — SYSTOLIC BLOOD PRESSURE: 132 MMHG | DIASTOLIC BLOOD PRESSURE: 49 MMHG

## 2020-06-16 VITALS — SYSTOLIC BLOOD PRESSURE: 158 MMHG | DIASTOLIC BLOOD PRESSURE: 79 MMHG

## 2020-06-16 VITALS — DIASTOLIC BLOOD PRESSURE: 67 MMHG | SYSTOLIC BLOOD PRESSURE: 175 MMHG

## 2020-06-16 NOTE — NUR
PATIENT UP WITH SBA WALKER AND GAIT BELT THIS SHIFT, THERAPIES COMPLETED. PRN
TRAMADOL GIVEN FOR BACK PAIN AND ICE PACKS PROVIDED. PATIENT HAD A BM X 2 THIS
SHIFT.

## 2020-06-16 NOTE — NUR
ASSUMED PT CARE AT 1930.  PT ALERT AND ORIENTED X4, POLITE AND COOPERATIVE
WITH CARES.  PT SITTING UP IN CHAIR TALKING ON PHONE AT SHIFT CHANGE.  ICE
PACK PROVIDED FOR BACK.  DENIES OFFERED PAIN MEDS.  PT UP WITH ASSIST OF ONE
TO BATHROOM TO VOID.  PT SLEPT WELL OVERNIGHT.  CALL LIGHT IN REACH.  HOURLY
ROUNDING IN PROGRESS, WILL CONTINUE TO MONITOR.

## 2020-06-17 VITALS — SYSTOLIC BLOOD PRESSURE: 146 MMHG | DIASTOLIC BLOOD PRESSURE: 57 MMHG

## 2020-06-17 VITALS — DIASTOLIC BLOOD PRESSURE: 47 MMHG | SYSTOLIC BLOOD PRESSURE: 137 MMHG

## 2020-06-17 NOTE — NUR
SLEPT LATE @ 0000-6/17-WED & SLEEPING GOOD ALL NIGHT.CALLED TWICE TO FIX ROOM
TEMPERATURE.TOOK ONLY 50 % BLACK DECAF COFFEE AS HS SNACK.BRP W/ SBA X2.

## 2020-06-17 NOTE — NUR
NOELLE and Dr Figueroa met with pt to review team conference summary and plan for pt
to complete mod I trial and likely dc home alone on Saturday.  SW met with pt
again and pt wondering about possibility for new hospital bed and/or mattress;
SW explained SW will be able to follow up with pt about that tomorrow.  Pt has
NP Blossom Morataya (sp?) out of [place] who she plans to follow up with in
the future about a possible rollator walker; pt has a standard front wheeled
walker she plans to continue to use initially since she has been working with
this walker with therapies.  SW to continue to follow to assist with safe dc
planning.

## 2020-06-17 NOTE — NUR
ASSUMED CARE @ 1916-6/16-TUES.SITS IN RECLINER WATCHING TV.CHAIR ALARM ALREADY
ON @ 1916.HOB UP IN BED.BED ALARM PUT ON @ 2120.ICE PACK APPLIED TO MID LOWER
BACK @ 2130 TO 2200.SEE PAIN MANAGEMENT @ 2049.ON HOURLY ROUNDS.

## 2020-06-18 VITALS — SYSTOLIC BLOOD PRESSURE: 145 MMHG | DIASTOLIC BLOOD PRESSURE: 61 MMHG

## 2020-06-18 VITALS — SYSTOLIC BLOOD PRESSURE: 138 MMHG | DIASTOLIC BLOOD PRESSURE: 55 MMHG

## 2020-06-18 NOTE — NUR
ASSUMED CARE @ 1907-6/17-WED.AWAKE IN BED W/ HOB UP.WATCHING TV.BED ALARM PUT
ON @ 1907.WANTS BATHROOM LIGHTS ON ALL NIGHT.ON HOURLY ROUNDS.SBA FOR ALL
TRANSFERS & TOILETING.

## 2020-06-18 NOTE — NUR
ASSUMED PT CARE AT 1930.  PT ALERT AND ORIENTED X4, POLITE AND COOPERATIVE
WITH CARES.  PT UP TO BATHROOM TO VOID SEVERAL TIMES AND STOOL X1.  PT GIVEN
MOM AT 2300, REQUESTING MIRALAX BE ORDERED IN THE MORNING.  PT RATES PAIN AS A
4/10 BUT WANTS TO WAIT BEFORE TAKING PAIN MEDICATION.  CALL LIGHT IN REACH,
BED ALARM ON FOR SAFETY, WILL CONTINUE TO MONITOR.

## 2020-06-19 VITALS — DIASTOLIC BLOOD PRESSURE: 64 MMHG | SYSTOLIC BLOOD PRESSURE: 147 MMHG

## 2020-06-19 VITALS — DIASTOLIC BLOOD PRESSURE: 63 MMHG | SYSTOLIC BLOOD PRESSURE: 145 MMHG

## 2020-06-19 LAB
BACTERIA-REFLEX: (no result) /HPF
BILIRUB UR-MCNC: NEGATIVE MG/DL
COLOR UR: YELLOW
KETONES UR STRIP-MCNC: NEGATIVE MG/DL
PROT UR QL STRIP: NEGATIVE
RBC # UR STRIP: (no result) /UL
RBC #/AREA URNS HPF: (no result) /HPF (ref 0–2)
SP GR UR STRIP: <= 1.005 (ref 1–1.03)
SQUAMOUS: (no result) /LPF (ref 0–3)
URINE CLARITY: CLEAR
URINE GLUCOSE-RANDOM: NEGATIVE
URINE LEUKOCYTES-REFLEX: (no result)
URINE NITRITE-REFLEX: NEGATIVE
URINE WBC-REFLEX: (no result) /HPF (ref 0–5)
UROBILINOGEN UR STRIP-ACNC: 0.2 E.U./DL (ref 0.2–1)

## 2020-06-19 NOTE — NUR
Pt to dc home on Sunday; pt dtr plans to provide pt ride home.
Fax info and final dc orders/med list to pt [facility] fax
number: [phone]
Hospital bed to be provided through [facility] [phone].  They will contact pt to
arranged timing of delivery to pt home once pt is home.

## 2020-06-19 NOTE — NUR
ALERT AND ORIENTED X4.  UP WITH 1 ASSIST, GAIT BELT AND WALKER.  PO PAIN
MEDICATION GIVEN AND HELPFUL WITH BACK PAIN.  C/O BURNING WITH AFTER
URINATION.  DR NOTIFIED.  UA RESULTS CALLED TO DR. SOLIS ANTIBIODIC ORDERED.
NEW ORDER TODAY FOR PATIENT TO MODIFIED INDEPENDENT IN ROOM.  USES CALL LIGHT
WHEN NEEDING ASSIST.

## 2020-06-19 NOTE — NUR
PT SLEPT OFF AND ON OVERNIGHT.  UP TO BATHROOM TO VOID X4 AND STOOL X2.  PT
KEPT AWAKE BY ENVIRONMENTAL NOISE RESULTING FROM PLUMBING REPAIRS.  PT WOULD
LIKE THE DOCTOR TO ORDER MIRALAX WHICH SHE TAKES AT HOME. PT TOOK MILK OF
MAGNESIA X1.  PT HAS PRODUCTIVE COUGH AND IS WORRIED SHE WILL NEED ANTIBIOTICS
FOR BRONCHITIS.  SPUTUM OBSERVED BY THIS NURSE WAS EXTREMELY THICK AND LIGHT
YELLOW.  CALL LIGHT IN REACH.  BED ALARM ON FOR SAFETY. HOURLY ROUNDING IN
PROGRESS, WILL CONTINUE TO MONITOR.

## 2020-06-20 VITALS — SYSTOLIC BLOOD PRESSURE: 137 MMHG | DIASTOLIC BLOOD PRESSURE: 49 MMHG

## 2020-06-20 VITALS — SYSTOLIC BLOOD PRESSURE: 126 MMHG | DIASTOLIC BLOOD PRESSURE: 58 MMHG

## 2020-06-20 NOTE — NUR
ASSUMMED CARE OF PT AT 0730, PT ALERT AND ORIENTED, MOD I IN ROOM, C/O BACK
PAIN MEDICATED X2 WITH GOOD RELIEF, ICE PACKS TO BACK AT INTERVALS, PT STATES
SHE DOES NOT HAVE BURNING WITH URINATION TODAY, TAKING FOOD AND FLUIDS WELL,
UP IN CHAIR ALL SHIFT, ASSESSMENT COMPLETE, HOURLY ROUNDING DONE WILL CONTINUE
TO MONITOR, PT STATES SHE TALKS WITH DAUGHTER DAILY AND DOES NOT NEED NURSE TO
UPDATE FAMILY.

## 2020-06-20 NOTE — NUR
SLEPT MOST OF THE NIGHT. MOD I IN ROOM. UP TO VOID PER SELF. NO C/O PAIN, NO
REQUESTS VERBALIZED. HOURLY ROUNDS CONTINUE. BED ALARM ON. CALL LITE IN REACH.

## 2020-06-20 NOTE — NUR
ASSUMED CARE AT 1930. PATIENT RESTING IN CHAIR. MOD I IN ROOM. USES WALKER. NO
UNSAFE BEHAVIORS NOTED. FIRST DOSE OF CIPRO GIVEN. TAKES PILLS WHOLE WITH
WATER. CALL LITE IN REACH. HOURLY ROUNDS CONTINUE.

## 2020-06-20 NOTE — NUR
ASSUMED CARE AT 1930. PATIENT MOD I IN ROOM. ASKS MANY QUESTIONS AND STILL HAS
SMALL NEEDS FULFILLED BY NURSING. NO C/O PAIN. TAKES PILLS WHOLE ONE AT A
TIME. TURNS SELF. HOURLY ROUNDS CONTINUE. CALL LITE IN REACH.

## 2020-06-21 VITALS — SYSTOLIC BLOOD PRESSURE: 139 MMHG | DIASTOLIC BLOOD PRESSURE: 65 MMHG

## 2020-06-21 VITALS — DIASTOLIC BLOOD PRESSURE: 65 MMHG | SYSTOLIC BLOOD PRESSURE: 139 MMHG

## 2020-06-21 NOTE — NUR
HAS BEEN MOD I IN ROOM ALL SHIFT. USES WALKER IN ROOM.  NO UNSAFE BEHAVIORS
NOTED, BUT THIS NURSE DID GIVE SOME HINTS AS TO MANAGEMENT OF ROOM FURNITURE
INCLUDING CHAIR AND BEDSIDE TABLE PLACEMENT TO ALLOW EASY PASSAGE THROUGH
ROOM. HOURLY ROUNDS CONTINUE. CALL LITE IN REACH.

## 2020-06-21 NOTE — NUR
ASSUMMED CARE OF PT AT 0730, PT ALERT AND ORIENTED, MOD I IN ROOM, PT C/O PAIN
IN BACK, MEDICATED X 1 FOR PAIN, PT WASHED SELF AT SINK, DRESSED SELF,
ASSESSMENT COMPLETE, HOURLY ROUNDING COMPLETE, ORDERS OBTAINED FOR DISCHARGE,
DISCUSSED HOME MEDICATIONS, FOLLOW UP APPOINTMENTS, WHEN TO CALL PHYSICIAN,
HOME HEALTH, QUESTIONS ANSWERED, SCRIPTS GIVEN TO PT, PT DISCHARGED PER
THERAPIST TO ER ENTRANCE, WITH BELONGINGS.

## 2020-06-27 ENCOUNTER — HOSPITAL ENCOUNTER (EMERGENCY)
Dept: HOSPITAL 96 - M.ERS | Age: 75
Discharge: HOME | End: 2020-06-27
Payer: MEDICARE

## 2020-06-27 VITALS — WEIGHT: 184 LBS | BODY MASS INDEX: 31.41 KG/M2 | HEIGHT: 64 IN

## 2020-06-27 VITALS — SYSTOLIC BLOOD PRESSURE: 166 MMHG | DIASTOLIC BLOOD PRESSURE: 71 MMHG

## 2020-06-27 DIAGNOSIS — Z88.1: ICD-10-CM

## 2020-06-27 DIAGNOSIS — Z91.018: ICD-10-CM

## 2020-06-27 DIAGNOSIS — Z90.710: ICD-10-CM

## 2020-06-27 DIAGNOSIS — Z88.0: ICD-10-CM

## 2020-06-27 DIAGNOSIS — Z87.891: ICD-10-CM

## 2020-06-27 DIAGNOSIS — Z88.6: ICD-10-CM

## 2020-06-27 DIAGNOSIS — Z88.2: ICD-10-CM

## 2020-06-27 DIAGNOSIS — Z90.49: ICD-10-CM

## 2020-06-27 DIAGNOSIS — J44.9: ICD-10-CM

## 2020-06-27 DIAGNOSIS — I10: Primary | ICD-10-CM

## 2020-06-27 DIAGNOSIS — Z88.8: ICD-10-CM

## 2020-06-27 LAB
ABSOLUTE BASOPHILS: 0 THOU/UL (ref 0–0.2)
ABSOLUTE EOSINOPHILS: 0.3 THOU/UL (ref 0–0.7)
ABSOLUTE MONOCYTES: 1.1 THOU/UL (ref 0–1.2)
ALBUMIN SERPL-MCNC: 3.4 G/DL (ref 3.4–5)
ALP SERPL-CCNC: 134 U/L (ref 46–116)
ALT SERPL-CCNC: 23 U/L (ref 30–65)
ANION GAP SERPL CALC-SCNC: 8 MMOL/L (ref 7–16)
AST SERPL-CCNC: 16 U/L (ref 15–37)
BASOPHILS NFR BLD AUTO: 0.4 %
BILIRUB SERPL-MCNC: 0.2 MG/DL
BUN SERPL-MCNC: 16 MG/DL (ref 7–18)
CALCIUM SERPL-MCNC: 9 MG/DL (ref 8.5–10.1)
CHLORIDE SERPL-SCNC: 103 MMOL/L (ref 98–107)
CO2 SERPL-SCNC: 30 MMOL/L (ref 21–32)
CREAT SERPL-MCNC: 0.6 MG/DL (ref 0.6–1.3)
EOSINOPHIL NFR BLD: 2.9 %
GLUCOSE SERPL-MCNC: 117 MG/DL (ref 70–99)
GRANULOCYTES NFR BLD MANUAL: 56.1 %
HCT VFR BLD CALC: 43.7 % (ref 37–47)
HGB BLD-MCNC: 15 GM/DL (ref 12–15)
LYMPHOCYTES # BLD: 3.1 THOU/UL (ref 0.8–5.3)
LYMPHOCYTES NFR BLD AUTO: 30.2 %
MCH RBC QN AUTO: 29.9 PG (ref 26–34)
MCHC RBC AUTO-ENTMCNC: 34.2 G/DL (ref 28–37)
MCV RBC: 87.5 FL (ref 80–100)
MONOCYTES NFR BLD: 10.4 %
MPV: 7.7 FL. (ref 7.2–11.1)
NEUTROPHILS # BLD: 5.7 THOU/UL (ref 1.6–8.1)
NUCLEATED RBCS: 0 /100WBC
PLATELET COUNT*: 281 THOU/UL (ref 150–400)
POTASSIUM SERPL-SCNC: 3.7 MMOL/L (ref 3.5–5.1)
PROT SERPL-MCNC: 7.3 G/DL (ref 6.4–8.2)
RBC # BLD AUTO: 5 MIL/UL (ref 4.2–5)
RDW-CV: 14.5 % (ref 10.5–14.5)
SODIUM SERPL-SCNC: 141 MMOL/L (ref 136–145)
WBC # BLD AUTO: 10.2 THOU/UL (ref 4–11)

## 2020-06-29 NOTE — [place]KG
[place] [address]
Phone:  [phone]                     ELECTROCARDIOGRAM REPORT      
_______________________________________________________________________________
 
Name:         EMILEE MURILLO           Room:                     [facility]#:    [id number]     Account #:     [id number]  
Admission:    20    Attend Phys:                     
Discharge:    20    Date of Birth: [birth year]  
Date of Service: 20  Report #:      [id number]
        [id number]
_______________________________________________________________________________
THIS REPORT FOR:  //name//                      
 
                         [facility] ED
                                       
Test Date:    2020               Test Time:    19:18:06
Pat Name:     EMILEE MURILLO          Department:   
Patient ID:   [id number]            Room:          
Gender:       F                        Technician:   KIM
:          1945               Requested By: Annemarie Carroll
Order Number: [id number] MD:   Billy Lawson
                                 Measurements
Intervals                              Axis          
Rate:         115                      P:            70
OH:           177                      QRS:          24
QRSD:         87                       T:            41
QT:           328                                    
QTc:          454                                    
                           Interpretive Statements
Sinus tachycardia
Anterior infarct, old
Compared to ECG 2019 13:08:45
No significant changes
Electronically Signed On 2020 10:36:40 CDT by Billy Lawson
[email or web address]
 
 
 
 
 
 
 
 
 
 
 
 
 
 
 
 
 
 
 
 
  <ELECTRONICALLY SIGNED>
                                           By: Billy Lawson MD, [facility]      
  20     1036
D: 20   _____________________________________
T: 20   Billy Lawson MD, [facility]        /EPI

## 2022-02-11 ENCOUNTER — HOSPITAL ENCOUNTER (OUTPATIENT)
Dept: HOSPITAL 96 - M.MRI | Age: 77
End: 2022-02-11
Attending: ORTHOPAEDIC SURGERY
Payer: COMMERCIAL
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
## 2022-02-11 DIAGNOSIS — S83.242A: Primary | ICD-10-CM

## 2022-02-11 DIAGNOSIS — M25.862: ICD-10-CM
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
## 2022-02-11 DIAGNOSIS — M17.12: ICD-10-CM
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
## 2022-02-11 DIAGNOSIS — M25.462: ICD-10-CM
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
